# Patient Record
Sex: FEMALE | Race: WHITE | URBAN - METROPOLITAN AREA
[De-identification: names, ages, dates, MRNs, and addresses within clinical notes are randomized per-mention and may not be internally consistent; named-entity substitution may affect disease eponyms.]

---

## 2017-01-11 ENCOUNTER — TELEPHONE (OUTPATIENT)
Dept: DERMATOLOGY | Facility: CLINIC | Age: 32
End: 2017-01-11

## 2017-01-11 NOTE — TELEPHONE ENCOUNTER
Called patient noticed labs were not completed, she stated she lives in [place] and she will not be getting them done anytime soon, unless she gets them done up there. She said she might do that however her hair is coming back. She will call back if she wants to schedule a follow up.     Amandrey Hill, [facility]

## 2017-04-11 ENCOUNTER — TELEPHONE (OUTPATIENT)
Dept: DERMATOLOGY | Facility: CLINIC | Age: 32
End: 2017-04-11

## 2017-04-11 NOTE — TELEPHONE ENCOUNTER
Will cancel lab orders. See tel enc below from 1/11/17.      1/11/17  Called patient noticed labs were not completed, she stated she lives in [place] and she will not be getting them done anytime soon, unless she gets them done up there. She said she might do that however her hair is coming back. She will call back if she wants to schedule a follow up.      Amorrhetal Hill, BRENDA Hernandez CMA

## 2017-07-17 ENCOUNTER — TELEPHONE (OUTPATIENT)
Dept: DERMATOLOGY | Facility: CLINIC | Age: 32
End: 2017-07-17

## 2017-07-17 NOTE — TELEPHONE ENCOUNTER
Called and spoke with pt. remind pt she is due for Vitamin D level.   Future Order placed by Dr. Dawkins 12/30/16.  Pt stated she has moved to [place] and has changed Health Care Providers.    Advised pt the Future Lab orders from Dr. Dawkins will be cancelled per her request.    Marley Booth LPN